# Patient Record
Sex: MALE | Race: WHITE | NOT HISPANIC OR LATINO | Employment: FULL TIME | URBAN - METROPOLITAN AREA
[De-identification: names, ages, dates, MRNs, and addresses within clinical notes are randomized per-mention and may not be internally consistent; named-entity substitution may affect disease eponyms.]

---

## 2017-01-13 ENCOUNTER — TREATMENT (OUTPATIENT)
Dept: PHYSICAL THERAPY | Facility: CLINIC | Age: 37
End: 2017-01-13

## 2017-01-13 DIAGNOSIS — G89.29 CHRONIC LOW BACK PAIN WITH SCIATICA, SCIATICA LATERALITY UNSPECIFIED, UNSPECIFIED BACK PAIN LATERALITY: ICD-10-CM

## 2017-01-13 DIAGNOSIS — G89.29 CHRONIC LEFT SI JOINT PAIN: Primary | ICD-10-CM

## 2017-01-13 DIAGNOSIS — M53.3 CHRONIC LEFT SI JOINT PAIN: Primary | ICD-10-CM

## 2017-01-13 DIAGNOSIS — M54.40 CHRONIC LOW BACK PAIN WITH SCIATICA, SCIATICA LATERALITY UNSPECIFIED, UNSPECIFIED BACK PAIN LATERALITY: ICD-10-CM

## 2017-01-13 PROCEDURE — 97116 GAIT TRAINING THERAPY: CPT | Performed by: PHYSICAL THERAPIST

## 2017-01-13 PROCEDURE — 97110 THERAPEUTIC EXERCISES: CPT | Performed by: PHYSICAL THERAPIST

## 2017-01-13 NOTE — PROGRESS NOTES
NAME: Tanmay Hutton     PATIENT MRN: [id number]  DATE: 2017      Daily Note  Visit: 6        Subjective     Pt reports he is doing really well. Pt reports he no longer has any numbness or radicular sx's in his leg and he is doing yoga daily. Goal now is to begin running again    Objective     See manual, procedures and exercise log    Assessment/Plan     Educated to begin running slowly and ease into it. Progressed with stabilization exercises and added hip flexor stretching.    Progress per Plan of Care    Therapeutic Exercise:   35    ;  Manual Therapy:   -   ;  Neuromuscular Re-Education:      -   ;  Therapeutic Activity:    -   ;  Gait Trainin   ;   Ultrasound:   -   ;  Electrical Stimulation:   -   ;  Traction: -    Timed treatment minutes:   46     Total treatment minutes:   65       Samaria Bradford, PT  Physical Therapist, PT, DPT  [place] # [id number]

## 2017-01-16 ENCOUNTER — TREATMENT (OUTPATIENT)
Dept: PHYSICAL THERAPY | Facility: CLINIC | Age: 37
End: 2017-01-16

## 2017-01-16 DIAGNOSIS — G89.29 CHRONIC LEFT SI JOINT PAIN: Primary | ICD-10-CM

## 2017-01-16 DIAGNOSIS — M54.40 CHRONIC LOW BACK PAIN WITH SCIATICA, SCIATICA LATERALITY UNSPECIFIED, UNSPECIFIED BACK PAIN LATERALITY: ICD-10-CM

## 2017-01-16 DIAGNOSIS — G89.29 CHRONIC LOW BACK PAIN WITH SCIATICA, SCIATICA LATERALITY UNSPECIFIED, UNSPECIFIED BACK PAIN LATERALITY: ICD-10-CM

## 2017-01-16 DIAGNOSIS — M53.3 CHRONIC LEFT SI JOINT PAIN: Primary | ICD-10-CM

## 2017-01-16 PROCEDURE — 97110 THERAPEUTIC EXERCISES: CPT | Performed by: PHYSICAL THERAPIST

## 2017-01-16 PROCEDURE — 97116 GAIT TRAINING THERAPY: CPT | Performed by: PHYSICAL THERAPIST

## 2017-01-16 NOTE — PROGRESS NOTES
NAME: Tanmay Hutton     PATIENT MRN: [id number]  DATE: 1/16/2017      Daily Note  Visit: 7        Subjective     Pt reports he felt good after the jog and exercises last session    Objective     See manual, procedures and exercise log    Assessment/Plan     Pt doing very well. Able to progress with treadmill jogging and will continue to progress running time as able    Progress per Plan of Care    Therapeutic Exercise:   29    ;  Manual Therapy:   -   ;  Neuromuscular Re-Education:      3   ;  Therapeutic Activity:    -   ;  Gait Training:   10   ;   Ultrasound:   -   ;  Electrical Stimulation:   -   ;  Traction: -    Timed treatment minutes:   42     Total treatment minutes:   60       Samaria Bradford, PT  Physical Therapist, PT, DPT  [place] # [id number]

## 2017-01-18 ENCOUNTER — TREATMENT (OUTPATIENT)
Dept: PHYSICAL THERAPY | Facility: CLINIC | Age: 37
End: 2017-01-18

## 2017-01-18 DIAGNOSIS — M54.40 CHRONIC LOW BACK PAIN WITH SCIATICA, SCIATICA LATERALITY UNSPECIFIED, UNSPECIFIED BACK PAIN LATERALITY: ICD-10-CM

## 2017-01-18 DIAGNOSIS — G89.29 CHRONIC LOW BACK PAIN WITH SCIATICA, SCIATICA LATERALITY UNSPECIFIED, UNSPECIFIED BACK PAIN LATERALITY: ICD-10-CM

## 2017-01-18 DIAGNOSIS — G89.29 CHRONIC LEFT SI JOINT PAIN: Primary | ICD-10-CM

## 2017-01-18 DIAGNOSIS — M53.3 CHRONIC LEFT SI JOINT PAIN: Primary | ICD-10-CM

## 2017-01-18 PROCEDURE — 97110 THERAPEUTIC EXERCISES: CPT | Performed by: PHYSICAL THERAPIST

## 2017-01-18 NOTE — PROGRESS NOTES
NAME: Tanmay Hutton     PATIENT MRN: [id number]  DATE: 1/18/2017      Daily Note  Visit: 8        Subjective     Pt reports that he is doing good. Has not had any trouble.    Objective     See manual, procedures and exercise log    Assessment/Plan     Pt had limited time. No pain with SKI EDGE just reported as difficult    Progress per Plan of Care    Therapeutic Exercise:   30    ;  Manual Therapy:   -   ;  Neuromuscular Re-Education:      3   ;  Therapeutic Activity:    -   ;  Gait Training:   -   ;   Ultrasound:   -   ;  Electrical Stimulation:   -   ;  Traction: -    Timed treatment minutes:   33     Total treatment minutes:   50       Samaria Bradford, PT  Physical Therapist, PT, DPT  [place] # [id number]

## 2017-01-20 ENCOUNTER — TREATMENT (OUTPATIENT)
Dept: PHYSICAL THERAPY | Facility: CLINIC | Age: 37
End: 2017-01-20

## 2017-01-20 DIAGNOSIS — M54.40 CHRONIC LOW BACK PAIN WITH SCIATICA, SCIATICA LATERALITY UNSPECIFIED, UNSPECIFIED BACK PAIN LATERALITY: ICD-10-CM

## 2017-01-20 DIAGNOSIS — M53.3 CHRONIC LEFT SI JOINT PAIN: Primary | ICD-10-CM

## 2017-01-20 DIAGNOSIS — G89.29 CHRONIC LOW BACK PAIN WITH SCIATICA, SCIATICA LATERALITY UNSPECIFIED, UNSPECIFIED BACK PAIN LATERALITY: ICD-10-CM

## 2017-01-20 DIAGNOSIS — G89.29 CHRONIC LEFT SI JOINT PAIN: Primary | ICD-10-CM

## 2017-01-20 PROCEDURE — 97110 THERAPEUTIC EXERCISES: CPT | Performed by: PHYSICAL THERAPIST

## 2017-01-20 NOTE — PROGRESS NOTES
NAME: Tanmay Hutton     PATIENT MRN: [id number]  DATE: 1/20/2017      Daily Note  Visit: 9        Subjective     Pt reports that he has had no issues. Ran about a mile today with no issues.    Objective     See manual, procedures and exercise log    Assessment/Plan     Pt doing excellent. Anticipate DC next session due to goals met    Progress per Plan of Care    Therapeutic Exercise:   34    ;  Manual Therapy:   -   ;  Neuromuscular Re-Education:      -   ;  Therapeutic Activity:    -   ;  Gait Training:   -   ;   Ultrasound:   -   ;  Electrical Stimulation:   -   ;  Traction: -    Timed treatment minutes:   34     Total treatment minutes:   55       Samaria Bradford, PT  Physical Therapist, PT, DPT  [place] # [id number]

## 2017-02-14 ENCOUNTER — DOCUMENTATION (OUTPATIENT)
Dept: PHYSICAL THERAPY | Facility: CLINIC | Age: 37
End: 2017-02-14

## 2017-02-14 NOTE — PROGRESS NOTES
NAME: Tanmay Hutton     PATIENT MRN: [id number]  DATE: 2/14/2017    Discharge Note   Visits: 9    Subjective     Pt did not show to final appt for reassessment. Prior to this appt he was reported no pain and good understanding of HEP    Objective     Unable to formally assess objective measures or outcome measure due to unscheduled discharge. Please see last visit or progress note to see function at last visit.    Goals: All Met    Assessment/Plan /Discharge status.    Pt did excellent with therapy. Pt reports no pain or radicular sx's and is returning slowly to running and workout activities as able    Plan Additional:  Discharge to home with HEP    Samaria Bradford, PT  Physical Therapist  [place] #[id number]

## 2021-03-12 ENCOUNTER — IMMUNIZATION (OUTPATIENT)
Dept: VACCINE CLINIC | Facility: HOSPITAL | Age: 41
End: 2021-03-12

## 2021-03-12 PROCEDURE — 91300 HC SARSCOV02 VAC 30MCG/0.3ML IM: CPT | Performed by: OBSTETRICS & GYNECOLOGY

## 2021-03-12 PROCEDURE — 0001A: CPT | Performed by: OBSTETRICS & GYNECOLOGY

## 2021-04-05 ENCOUNTER — APPOINTMENT (OUTPATIENT)
Dept: VACCINE CLINIC | Facility: HOSPITAL | Age: 41
End: 2021-04-05

## 2025-06-29 PROBLEM — M51.369 DDD (DEGENERATIVE DISC DISEASE), LUMBAR: Status: ACTIVE | Noted: 2022-09-27

## 2025-06-29 PROBLEM — Z82.49 FAMILY HISTORY OF CARDIOVASCULAR DISEASE: Status: ACTIVE | Noted: 2022-09-27

## 2025-06-29 PROBLEM — E78.5 HYPERLIPIDEMIA: Status: ACTIVE | Noted: 2022-09-28

## 2025-07-08 ENCOUNTER — PATIENT ROUNDING (BHMG ONLY) (OUTPATIENT)
Dept: URGENT CARE | Facility: CLINIC | Age: 45
End: 2025-07-08
Payer: COMMERCIAL

## 2025-07-08 NOTE — ED NOTES
Thank you for letting us care for you in your recent visit to our urgent care center. We would love to hear about your experience with us. Was this the first time you have visited our location?    We’re always looking for ways to make our patients’ experiences even better. Do you have any recommendations on ways we may improve?     I appreciate you taking the time to respond. Please be on the lookout for a survey about your recent visit from [facility] via text or email. We would greatly appreciate if you could fill that out and turn it back in. We want your voice to be heard and we value your feedback.   Thank you for choosing [facility] for your healthcare needs.